# Patient Record
Sex: FEMALE
[De-identification: names, ages, dates, MRNs, and addresses within clinical notes are randomized per-mention and may not be internally consistent; named-entity substitution may affect disease eponyms.]

---

## 2024-11-04 PROBLEM — 711150003: Status: ACTIVE | Noted: 2024-11-04

## 2024-11-04 PROBLEM — 112561000119108: Status: ACTIVE | Noted: 2024-11-04

## 2024-11-05 ENCOUNTER — DASHBOARD ENCOUNTERS (OUTPATIENT)
Age: 79
End: 2024-11-05

## 2024-11-05 ENCOUNTER — OFFICE VISIT (OUTPATIENT)
Dept: URBAN - METROPOLITAN AREA CLINIC 72 | Facility: CLINIC | Age: 79
End: 2024-11-05
Payer: MEDICARE

## 2024-11-05 VITALS
WEIGHT: 149.4 LBS | TEMPERATURE: 97.3 F | SYSTOLIC BLOOD PRESSURE: 128 MMHG | HEIGHT: 65 IN | DIASTOLIC BLOOD PRESSURE: 62 MMHG | HEART RATE: 62 BPM | BODY MASS INDEX: 24.89 KG/M2

## 2024-11-05 DIAGNOSIS — Z87.19 HX OF CROHN'S DISEASE: ICD-10-CM

## 2024-11-05 DIAGNOSIS — Z79.01 CHRONIC ANTICOAGULATION: ICD-10-CM

## 2024-11-05 PROCEDURE — 99204 OFFICE O/P NEW MOD 45 MIN: CPT

## 2024-11-05 RX ORDER — WARFARIN SODIUM 1 MG/1
TAKE 1 AND 1/2 TO 2 TABLETS BY MOUTH EVERY DAY AS DIRECTED BY ANTICOAGULATION SERVICES TABLET ORAL
Qty: 130 EACH | Refills: 3 | Status: ACTIVE | COMMUNITY

## 2024-11-05 RX ORDER — CYANOCOBALAMIN (VITAMIN B-12) 2500 MCG
AS DIRECTED TABLET, SUBLINGUAL SUBLINGUAL
Status: ACTIVE | COMMUNITY

## 2024-11-05 RX ORDER — LEVOTHYROXINE SODIUM 50 UG/1
TABLET ORAL
Qty: 90 TABLET | Status: ACTIVE | COMMUNITY

## 2024-11-05 RX ORDER — TRIAMCINOLONE ACETONIDE 1 MG/G
APPLY TO AFFECTED AREA TWICE A DAY UP TO 2 WEEKS/MONTH AS NEEDED CREAM TOPICAL
Qty: 454 GRAM | Refills: 0 | Status: ACTIVE | COMMUNITY

## 2024-11-05 RX ORDER — ATORVASTATIN CALCIUM 20 MG/1
TABLET, FILM COATED ORAL
Qty: 90 TABLET | Status: ACTIVE | COMMUNITY

## 2024-11-05 RX ORDER — ESTRADIOL 0.1 MG/G
APPLY PEA SIZED AMOUNT ON MON, WED,AND FRI CREAM VAGINAL
Qty: 42.5 GRAM | Refills: 0 | Status: ACTIVE | COMMUNITY

## 2024-11-05 RX ORDER — METOPROLOL TARTRATE 25 MG/1
TAKE 1 TABLET BY MOUTH TWICE A DAY TABLET, FILM COATED ORAL
Qty: 62 EACH | Refills: 4 | Status: ACTIVE | COMMUNITY

## 2024-11-05 RX ORDER — CYANOCOBALAMIN 1000 UG/ML
INJECT 1 ML INTRAMUSCULARLY EVERY MONTH INJECTION INTRAMUSCULAR; SUBCUTANEOUS
Qty: 3 MILLILITER | Refills: 3 | Status: ACTIVE | COMMUNITY

## 2024-11-05 RX ORDER — ACETAMINOPHEN ER 650 MG TABLET,EXTENDED RELEASE 650 MG
2 TABLETS AS NEEDED TABLET, EXTENDED RELEASE ORAL
Status: ACTIVE | COMMUNITY

## 2024-11-05 RX ORDER — ATORVASTATIN CALCIUM 20 MG/1
TAKE 1 TABLET BY MOUTH EVERYDAY AT BEDTIME TABLET, FILM COATED ORAL
Qty: 90 EACH | Refills: 0 | Status: ACTIVE | COMMUNITY

## 2024-11-05 RX ORDER — LEVOFLOXACIN 500 MG/1
TABLET, FILM COATED ORAL
Qty: 10 TABLET | Status: ON HOLD | COMMUNITY

## 2024-11-05 RX ORDER — POTASSIUM CHLORIDE 750 MG/1
TAKE 2 TABLETS BY MOUTH EVERY DAY TABLET, FILM COATED, EXTENDED RELEASE ORAL
Qty: 180 EACH | Refills: 0 | Status: ACTIVE | COMMUNITY

## 2024-11-05 NOTE — HPI-TODAY'S VISIT:
Patient is a 79-year-old female referred by Maria Gamez NP for history of Crohn's and ileostomy.  Review of medications does not include Crohn's medication.  Patient is here to establish service in case she does have any problems arise.  Has not had any Crohns issues since second surgery in 1982. Shehas had all of large bowel resected and approx 1/2 of small bowel. She has had 3-4 SBO since her resection - she attributes it to eating too much fiber at one time. No GI sx currently. No GERD, occasional heartburn, no abd pain. Output good. 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   Patient is on warfarin.

## 2025-06-24 ENCOUNTER — OFFICE VISIT (OUTPATIENT)
Dept: URBAN - METROPOLITAN AREA CLINIC 72 | Facility: CLINIC | Age: 80
End: 2025-06-24
Payer: MEDICARE

## 2025-06-24 DIAGNOSIS — Z93.9 HISTORY OF CREATION OF OSTOMY: ICD-10-CM

## 2025-06-24 DIAGNOSIS — Z90.49 HISTORY OF COLON RESECTION: ICD-10-CM

## 2025-06-24 DIAGNOSIS — K80.20 ASYMPTOMATIC CHOLELITHIASIS: ICD-10-CM

## 2025-06-24 DIAGNOSIS — Z87.19 HX OF CROHN'S DISEASE: ICD-10-CM

## 2025-06-24 PROBLEM — 266474003: Status: ACTIVE | Noted: 2025-06-24

## 2025-06-24 PROBLEM — 347861000119105: Status: ACTIVE | Noted: 2025-06-24

## 2025-06-24 PROCEDURE — 99214 OFFICE O/P EST MOD 30 MIN: CPT | Performed by: INTERNAL MEDICINE

## 2025-06-24 RX ORDER — ATORVASTATIN CALCIUM 20 MG/1
TABLET, FILM COATED ORAL
Qty: 90 TABLET | Status: ACTIVE | COMMUNITY

## 2025-06-24 RX ORDER — LEVOFLOXACIN 500 MG/1
TABLET, FILM COATED ORAL
Qty: 10 TABLET | Status: ON HOLD | COMMUNITY

## 2025-06-24 RX ORDER — POTASSIUM CHLORIDE 750 MG/1
TAKE 2 TABLETS BY MOUTH EVERY DAY TABLET, FILM COATED, EXTENDED RELEASE ORAL
Qty: 180 EACH | Refills: 0 | Status: ACTIVE | COMMUNITY

## 2025-06-24 RX ORDER — LEVOTHYROXINE SODIUM 50 UG/1
TABLET ORAL
Qty: 90 TABLET | Status: ACTIVE | COMMUNITY

## 2025-06-24 RX ORDER — ATORVASTATIN CALCIUM 20 MG/1
TAKE 1 TABLET BY MOUTH EVERYDAY AT BEDTIME TABLET, FILM COATED ORAL
Qty: 90 EACH | Refills: 0 | Status: ACTIVE | COMMUNITY

## 2025-06-24 RX ORDER — TRIAMCINOLONE ACETONIDE 1 MG/G
APPLY TO AFFECTED AREA TWICE A DAY UP TO 2 WEEKS/MONTH AS NEEDED CREAM TOPICAL
Qty: 454 GRAM | Refills: 0 | Status: ACTIVE | COMMUNITY

## 2025-06-24 RX ORDER — ESTRADIOL 0.1 MG/G
APPLY PEA SIZED AMOUNT ON MON, WED,AND FRI CREAM VAGINAL
Qty: 42.5 GRAM | Refills: 0 | Status: ACTIVE | COMMUNITY

## 2025-06-24 RX ORDER — CYANOCOBALAMIN 1000 UG/ML
INJECT 1 ML INTRAMUSCULARLY EVERY MONTH INJECTION INTRAMUSCULAR; SUBCUTANEOUS
Qty: 3 MILLILITER | Refills: 3 | Status: ACTIVE | COMMUNITY

## 2025-06-24 RX ORDER — METOPROLOL TARTRATE 25 MG/1
TAKE 1 TABLET BY MOUTH TWICE A DAY TABLET, FILM COATED ORAL
Qty: 62 EACH | Refills: 4 | Status: ACTIVE | COMMUNITY

## 2025-06-24 RX ORDER — ACETAMINOPHEN ER 650 MG TABLET,EXTENDED RELEASE 650 MG
2 TABLETS AS NEEDED TABLET, EXTENDED RELEASE ORAL
Status: ACTIVE | COMMUNITY

## 2025-06-24 RX ORDER — CYANOCOBALAMIN (VITAMIN B-12) 2500 MCG
AS DIRECTED TABLET, SUBLINGUAL SUBLINGUAL
Status: ACTIVE | COMMUNITY

## 2025-06-24 RX ORDER — WARFARIN SODIUM 1 MG/1
TAKE 1 AND 1/2 TO 2 TABLETS BY MOUTH EVERY DAY AS DIRECTED BY ANTICOAGULATION SERVICES TABLET ORAL
Qty: 130 EACH | Refills: 3 | Status: ACTIVE | COMMUNITY

## 2025-06-24 NOTE — HPI-TODAY'S VISIT:
Mrs. Callahan returns for follow-up.  Recall she is a 80-year-old last seen in office on 11/5/2024.  She has a history of Crohn's disease with ileostomy.  She is currently not on any treatment for Crohn's disease.  She reported a history of colon and small bowel resection in the 80s.  Has had small bowel obstructions 3-4 times since her surgery, related to dietary indiscretions.  She was asymptomatic at last office visit.  She is on warfarin for aFib.  She reports significant pain from her rectum, does not have discharge, has been using a cannabinoid cream which has been helping some.  She is very active at the pain in her rectum has limited her activity altogether.  She reports she has not been able to be as physically active as usual.  There is no blood.  She has tried lidocaine and other creams without success.  Lab work: February 2025: WBC 5.2, hemoglobin 12.4, hematocrit 38.8, platelet count 165, MCV 95.8, CRP less than 8, ESR 2  Imaging: done for urology, CT IVP (01/25/24) Cholelithiasis with mild dilatation of the common duct proximally. The gas immediately adjacent to the common duct is likely within adjacent bowel, less likely within the duct. No definite choledocholithiasis is identified.Further characterization may be obtained with ultrasound if clinically indicated. Postoperative changes status post hysterectomy and colectomy with an unremarkable appearing right abdominal ostomy

## 2025-06-24 NOTE — EXAM-PHYSICAL EXAM
General- no acute distress, resting comfortably Eyes- anicteric, no pallor HENT- normocephalic, atraumatic head Neck- no lymphadenopathy, symmetric Chest- non labored breathing, equal rise Abdomen- soft, non tender, non distended, no organomegaly, ostomy Ext: ADRIANA, no obvious sores or rashes